# Patient Record
Sex: FEMALE | Race: WHITE | NOT HISPANIC OR LATINO | URBAN - METROPOLITAN AREA
[De-identification: names, ages, dates, MRNs, and addresses within clinical notes are randomized per-mention and may not be internally consistent; named-entity substitution may affect disease eponyms.]

---

## 2021-11-16 ENCOUNTER — TELEPHONE (OUTPATIENT)
Dept: SCHEDULING | Facility: IMAGING CENTER | Age: 41
End: 2021-11-16

## 2021-11-23 ENCOUNTER — TELEPHONE (OUTPATIENT)
Dept: MEDICAL GROUP | Facility: CLINIC | Age: 41
End: 2021-11-23

## 2021-11-23 NOTE — TELEPHONE ENCOUNTER
Pt would like to schedule IUD placement. I spoke with her and she had her consultation in 2019 or 2020. Can she schedule the IUD placement directly, or will she need to schedule a new consultation.  She also needs a new IUD ordered

## 2021-12-17 ENCOUNTER — OFFICE VISIT (OUTPATIENT)
Dept: MEDICAL GROUP | Facility: CLINIC | Age: 41
End: 2021-12-17
Payer: COMMERCIAL

## 2021-12-17 VITALS
DIASTOLIC BLOOD PRESSURE: 64 MMHG | HEIGHT: 71 IN | SYSTOLIC BLOOD PRESSURE: 102 MMHG | WEIGHT: 130 LBS | OXYGEN SATURATION: 98 % | HEART RATE: 91 BPM | BODY MASS INDEX: 18.2 KG/M2

## 2021-12-17 DIAGNOSIS — Z30.017 NEXPLANON INSERTION: ICD-10-CM

## 2021-12-17 PROCEDURE — 11981 INSERTION DRUG DLVR IMPLANT: CPT | Performed by: FAMILY MEDICINE

## 2021-12-17 NOTE — PROCEDURES
Initial visit for Mirena placement which was unsuccessful as the internal os was closed and sounding could not be completed.  The patient opted for the nexplanon.  Reviewed procedure with patient.  All questions were answered.  Consent obtained.  Post procedure instructions reviewed with patient.  Confirmed the expiration date of the implant on the 's box. NDC number of the implant is 7638-8202-42. The implant card was given to the patient.     PROCEDURE: The LEFT arm was measured and marked- 10 cm proximal to the medial epicondyle, 3 cm inferior to the sulcus.  The area was prepped with betadine and infiltrated with  2 cc of 1% lidocaine. The nexplanon device was inserted easily and the implant deployed.  Placement of the implant was confirmed. A pressure dressing was applied.  Patient tolerated procedure well without complications.

## 2022-01-17 NOTE — RESULT ENCOUNTER NOTE
Lonnie Borden,    Your recent mammogram was normal but it did reveal you have dense breasts.   Dense breasts may have small masses that can be obscured on typical mammogram studies.  SonoCine is a new technique that is being used to screen for breast cancer in women with dense breasts. This study is not yet covered by insurance and is $125 out of pocket. If you are interested in having this study done, please contact me for an order.  Otherwise, it is recommended you follow up in one year for your screening mammogram.       Thank you,  Cydney Chapin M.D.

## 2023-10-17 ENCOUNTER — HOSPITAL ENCOUNTER (OUTPATIENT)
Dept: RADIOLOGY | Facility: MEDICAL CENTER | Age: 43
End: 2023-10-17
Attending: OBSTETRICS & GYNECOLOGY
Payer: COMMERCIAL

## 2023-10-17 ENCOUNTER — HOSPITAL ENCOUNTER (OUTPATIENT)
Dept: RADIOLOGY | Facility: MEDICAL CENTER | Age: 43
End: 2023-10-17
Attending: FAMILY MEDICINE
Payer: COMMERCIAL

## 2024-08-27 PROBLEM — Z13.220 LIPID SCREENING: Status: ACTIVE | Noted: 2024-08-27

## 2024-08-27 PROBLEM — R92.8 ABNORMAL MAMMOGRAM: Status: ACTIVE | Noted: 2021-08-31

## 2024-08-27 PROBLEM — Z11.59 ENCOUNTER FOR HEPATITIS C SCREENING TEST FOR LOW RISK PATIENT: Status: ACTIVE | Noted: 2024-08-27

## 2024-08-27 PROBLEM — Z11.4 SCREENING FOR HIV (HUMAN IMMUNODEFICIENCY VIRUS): Status: ACTIVE | Noted: 2024-08-27

## 2024-08-28 ENCOUNTER — OFFICE VISIT (OUTPATIENT)
Dept: MEDICAL GROUP | Facility: CLINIC | Age: 44
End: 2024-08-28
Payer: COMMERCIAL

## 2024-08-28 ENCOUNTER — HOSPITAL ENCOUNTER (OUTPATIENT)
Dept: LAB | Facility: MEDICAL CENTER | Age: 44
End: 2024-08-28
Attending: FAMILY MEDICINE
Payer: COMMERCIAL

## 2024-08-28 VITALS
DIASTOLIC BLOOD PRESSURE: 81 MMHG | HEART RATE: 95 BPM | OXYGEN SATURATION: 97 % | BODY MASS INDEX: 20.58 KG/M2 | SYSTOLIC BLOOD PRESSURE: 119 MMHG | WEIGHT: 147 LBS | HEIGHT: 71 IN | TEMPERATURE: 98 F | RESPIRATION RATE: 16 BRPM

## 2024-08-28 DIAGNOSIS — M79.671 RIGHT FOOT PAIN: ICD-10-CM

## 2024-08-28 DIAGNOSIS — Z11.59 ENCOUNTER FOR HEPATITIS C SCREENING TEST FOR LOW RISK PATIENT: ICD-10-CM

## 2024-08-28 DIAGNOSIS — Z13.220 LIPID SCREENING: ICD-10-CM

## 2024-08-28 DIAGNOSIS — Z30.09 FAMILY PLANNING: ICD-10-CM

## 2024-08-28 DIAGNOSIS — Z12.11 COLON CANCER SCREENING: ICD-10-CM

## 2024-08-28 DIAGNOSIS — R92.8 ABNORMAL MAMMOGRAM: ICD-10-CM

## 2024-08-28 DIAGNOSIS — Z11.4 SCREENING FOR HIV (HUMAN IMMUNODEFICIENCY VIRUS): ICD-10-CM

## 2024-08-28 LAB
ALBUMIN SERPL BCP-MCNC: 4.8 G/DL (ref 3.2–4.9)
ALBUMIN/GLOB SERPL: 1.8 G/DL
ALP SERPL-CCNC: 98 U/L (ref 30–99)
ALT SERPL-CCNC: 6 U/L (ref 2–50)
ANION GAP SERPL CALC-SCNC: 12 MMOL/L (ref 7–16)
AST SERPL-CCNC: 16 U/L (ref 12–45)
BILIRUB SERPL-MCNC: 0.4 MG/DL (ref 0.1–1.5)
BUN SERPL-MCNC: 11 MG/DL (ref 8–22)
CALCIUM ALBUM COR SERPL-MCNC: 9.2 MG/DL (ref 8.5–10.5)
CALCIUM SERPL-MCNC: 9.8 MG/DL (ref 8.5–10.5)
CHLORIDE SERPL-SCNC: 104 MMOL/L (ref 96–112)
CHOLEST SERPL-MCNC: 176 MG/DL (ref 100–199)
CO2 SERPL-SCNC: 22 MMOL/L (ref 20–33)
CREAT SERPL-MCNC: 0.74 MG/DL (ref 0.5–1.4)
GFR SERPLBLD CREATININE-BSD FMLA CKD-EPI: 102 ML/MIN/1.73 M 2
GLOBULIN SER CALC-MCNC: 2.7 G/DL (ref 1.9–3.5)
GLUCOSE SERPL-MCNC: 99 MG/DL (ref 65–99)
HCV AB SER QL: NORMAL
HDLC SERPL-MCNC: 80 MG/DL
HIV 1+2 AB+HIV1 P24 AG SERPL QL IA: NORMAL
LDLC SERPL CALC-MCNC: 78 MG/DL
POTASSIUM SERPL-SCNC: 4.4 MMOL/L (ref 3.6–5.5)
PROT SERPL-MCNC: 7.5 G/DL (ref 6–8.2)
SODIUM SERPL-SCNC: 138 MMOL/L (ref 135–145)
TRIGL SERPL-MCNC: 89 MG/DL (ref 0–149)

## 2024-08-28 PROCEDURE — 99214 OFFICE O/P EST MOD 30 MIN: CPT | Performed by: FAMILY MEDICINE

## 2024-08-28 PROCEDURE — 86803 HEPATITIS C AB TEST: CPT

## 2024-08-28 PROCEDURE — 87389 HIV-1 AG W/HIV-1&-2 AB AG IA: CPT

## 2024-08-28 PROCEDURE — 3074F SYST BP LT 130 MM HG: CPT | Performed by: FAMILY MEDICINE

## 2024-08-28 PROCEDURE — 80061 LIPID PANEL: CPT

## 2024-08-28 PROCEDURE — 3079F DIAST BP 80-89 MM HG: CPT | Performed by: FAMILY MEDICINE

## 2024-08-28 PROCEDURE — 36415 COLL VENOUS BLD VENIPUNCTURE: CPT

## 2024-08-28 PROCEDURE — 80053 COMPREHEN METABOLIC PANEL: CPT

## 2024-08-28 ASSESSMENT — PATIENT HEALTH QUESTIONNAIRE - PHQ9: CLINICAL INTERPRETATION OF PHQ2 SCORE: 0

## 2024-08-28 NOTE — PROGRESS NOTES
"Subjective:   CC: Follow-up contraception    HPI:   Mammo - dense 1/24, ABUS nl, TC 16%  No colon screening yet (45 in Feb '25)  TdaP 2019  Pap? - due  Nexplanon - desires removal, wants to try IUD (failed insertion previously, not on menses today)  Wellbutrin - d/c'd  Problem   Right Foot Pain   Family Planning   Abnormal Mammogram   Anxiety (Resolved)   Postpartum Depression (Resolved)       No current Epic-ordered outpatient medications on file.     No current Epic-ordered facility-administered medications on file.         ROS:  Gen: no fevers/chills  Pulm: no sob, no cough  CV: no chest pain, no palpitations  GI: no nausea/vomiting, no diarrhea        Objective:     Exam:  /81 (BP Location: Left arm, Patient Position: Sitting, BP Cuff Size: Adult)   Pulse 95   Temp 36.7 °C (98 °F) (Temporal)   Resp 16   Ht 1.803 m (5' 11\")   Wt 66.7 kg (147 lb)   SpO2 97%   BMI 20.50 kg/m²  Body mass index is 20.5 kg/m².    Gen: Alert and oriented, No apparent distress.  Neck: Neck is supple without lymphadenopathy.  Lungs: Normal effort, CTA bilaterally, no wheezes, rhonchi, or rales  CV: Regular rate and rhythm. No murmurs, rubs, or gallops.  Ext: No edema.  Right foot, no tenderness no redness no swelling no bruising no deformities, full pulses      Assessment & Plan:     44 y.o. female with the following -     Problem List Items Addressed This Visit       Abnormal mammogram     Mammo 1/24 dense, f/u ABUS wnl, TC 16%         Family planning     She is going to follow-up to have her Nexplanon removed and an IUD inserted.  I have advised her to present for the IUD placement while she is menstruating so that her internal os will be open.  She will also get a Pap smear at that time.         Right foot pain     Has been having some longstanding intermittent nontraumatic pain of her right lateral foot which does not seem to be related to any kind of activity.  It sometimes comes on at night and is never associated with " any redness warmth swelling.  Will start with a plain film and then have her see a sports colleague         Relevant Orders    DX-FOOT-COMPLETE 3+ RIGHT     Other Visit Diagnoses       Colon cancer screening        Relevant Orders    COLOGUARD (FIT DNA)                  No follow-ups on file.

## 2024-08-29 NOTE — ASSESSMENT & PLAN NOTE
Has been having some longstanding intermittent nontraumatic pain of her right lateral foot which does not seem to be related to any kind of activity.  It sometimes comes on at night and is never associated with any redness warmth swelling.  Will start with a plain film and then have her see a sports colleague

## 2024-08-29 NOTE — ASSESSMENT & PLAN NOTE
She is going to follow-up to have her Nexplanon removed and an IUD inserted.  I have advised her to present for the IUD placement while she is menstruating so that her internal os will be open.  She will also get a Pap smear at that time.

## 2024-12-11 ENCOUNTER — OFFICE VISIT (OUTPATIENT)
Dept: MEDICAL GROUP | Facility: CLINIC | Age: 44
End: 2024-12-11
Payer: COMMERCIAL

## 2024-12-11 VITALS
RESPIRATION RATE: 16 BRPM | SYSTOLIC BLOOD PRESSURE: 137 MMHG | HEIGHT: 70 IN | BODY MASS INDEX: 21.19 KG/M2 | WEIGHT: 148 LBS | OXYGEN SATURATION: 98 % | HEART RATE: 70 BPM | DIASTOLIC BLOOD PRESSURE: 84 MMHG

## 2024-12-11 DIAGNOSIS — Z30.09 FAMILY PLANNING: ICD-10-CM

## 2024-12-11 DIAGNOSIS — Z13.79 GENETIC SCREENING: ICD-10-CM

## 2024-12-11 DIAGNOSIS — Z12.11 COLON CANCER SCREENING: ICD-10-CM

## 2024-12-11 PROCEDURE — 3075F SYST BP GE 130 - 139MM HG: CPT | Performed by: FAMILY MEDICINE

## 2024-12-11 PROCEDURE — 99213 OFFICE O/P EST LOW 20 MIN: CPT | Performed by: FAMILY MEDICINE

## 2024-12-11 PROCEDURE — 3079F DIAST BP 80-89 MM HG: CPT | Performed by: FAMILY MEDICINE

## 2024-12-11 RX ORDER — MISOPROSTOL 200 UG/1
200 TABLET ORAL ONCE
Qty: 1 TABLET | Refills: 0 | Status: SHIPPED | OUTPATIENT
Start: 2024-12-11 | End: 2024-12-11

## 2024-12-12 NOTE — PROGRESS NOTES
"Subjective:   CC:    Follow-up  HPI:   Patient wants to discuss having her Nexplanon removed and a Mirena IUD placed.  She is not currently planning to have anymore children.  Her son is now 12 years old.  She did like the Nexplanon however she is looking forward to not having periods    She does have anxiety, she is in therapy twice a month which is going well she is not taking any medications.  She does get out and exercise regularly uses breathing techniques and enjoys traveling with her family  Problem   Family Planning       Current Outpatient Medications Ordered in Epic   Medication Sig Dispense Refill    miSOPROStol (CYTOTEC) 200 MCG Tab Take 1 Tablet by mouth one time for 1 dose. 1 Tablet 0     No current Epic-ordered facility-administered medications on file.         ROS:  Gen: no fevers/chills  Pulm: no sob, no cough  CV: no chest pain, no palpitations  GI: no nausea/vomiting, no diarrhea        Objective:     Exam:  /84 (BP Location: Left arm, Patient Position: Sitting, BP Cuff Size: Adult)   Pulse 70   Resp 16   Ht 1.778 m (5' 10\")   Wt 67.1 kg (148 lb)   SpO2 98%   BMI 21.24 kg/m²  Body mass index is 21.24 kg/m².    Gen: Alert and oriented, No apparent distress.  Neck: Neck is supple without lymphadenopathy.  Lungs: Normal effort, CTA bilaterally, no wheezes, rhonchi, or rales  CV: Regular rate and rhythm. No murmurs, rubs, or gallops.  Ext: No edema.      Assessment & Plan:     44 y.o. female with the following -     Problem List Items Addressed This Visit       Family planning     We will order her Nexplanon and schedule her an appointment for a Nexplanon removal and IUD placement.  Her IUD attempt 3 years ago was unsuccessful she was not on her period we will try a Cytotec 200 mcg p.o. 4 hours prior to IUD placement and ibuprofen 400 mg          Other Visit Diagnoses       Genetic screening        Relevant Orders    Referral to Genetic Research Studies    Colon cancer screening        " Relevant Orders    Cologuard® colon cancer screening                  No follow-ups on file.

## 2024-12-12 NOTE — ASSESSMENT & PLAN NOTE
We will order her Nexplanon and schedule her an appointment for a Nexplanon removal and IUD placement.  Her IUD attempt 3 years ago was unsuccessful she was not on her period we will try a Cytotec 200 mcg p.o. 4 hours prior to IUD placement and ibuprofen 400 mg

## 2025-02-20 ENCOUNTER — TELEPHONE (OUTPATIENT)
Dept: MEDICAL GROUP | Facility: CLINIC | Age: 45
End: 2025-02-20
Payer: COMMERCIAL

## 2025-02-20 NOTE — TELEPHONE ENCOUNTER
Phone Number Called: [phone] (home) [phone] (work)      Call outcome: Left detailed message for patient. Informed to call back with any additional questions.    Message: I called patient and left a message to call us back to schedule her for an IUD placement. No answer left message to call us back

## 2025-05-30 ENCOUNTER — OFFICE VISIT (OUTPATIENT)
Dept: MEDICAL GROUP | Facility: CLINIC | Age: 45
End: 2025-05-30
Payer: COMMERCIAL

## 2025-05-30 VITALS
HEART RATE: 100 BPM | DIASTOLIC BLOOD PRESSURE: 79 MMHG | TEMPERATURE: 98.6 F | HEIGHT: 71 IN | WEIGHT: 124.6 LBS | OXYGEN SATURATION: 96 % | SYSTOLIC BLOOD PRESSURE: 113 MMHG | BODY MASS INDEX: 17.44 KG/M2

## 2025-05-30 DIAGNOSIS — M62.830 LUMBAR PARASPINAL MUSCLE SPASM: ICD-10-CM

## 2025-05-30 DIAGNOSIS — Z12.31 ENCOUNTER FOR SCREENING MAMMOGRAM FOR MALIGNANT NEOPLASM OF BREAST: ICD-10-CM

## 2025-05-30 DIAGNOSIS — Z12.11 SCREENING FOR COLON CANCER: ICD-10-CM

## 2025-05-30 DIAGNOSIS — Z30.018 ENCOUNTER FOR INITIAL PRESCRIPTION OF OTHER CONTRACEPTIVES: ICD-10-CM

## 2025-05-30 PROBLEM — Z30.09 FAMILY PLANNING: Status: RESOLVED | Noted: 2024-08-27 | Resolved: 2025-05-30

## 2025-05-30 RX ORDER — METHOCARBAMOL 750 MG/1
750 TABLET, FILM COATED ORAL 3 TIMES DAILY
COMMUNITY
End: 2025-05-30

## 2025-05-30 RX ORDER — METHOCARBAMOL 750 MG/1
TABLET, FILM COATED ORAL
Qty: 30 TABLET | Refills: 0 | Status: SHIPPED | OUTPATIENT
Start: 2025-05-30 | End: 2025-06-06

## 2025-05-30 RX ORDER — IBUPROFEN 800 MG/1
800 TABLET, FILM COATED ORAL 3 TIMES DAILY
COMMUNITY

## 2025-05-30 ASSESSMENT — PATIENT HEALTH QUESTIONNAIRE - PHQ9: CLINICAL INTERPRETATION OF PHQ2 SCORE: 0

## 2025-05-30 NOTE — PROGRESS NOTES
"[facility] OFFICE VISIT    Date: 5/30/2025    MRN: [id number]  Patient ID: Suha Gay    SUBJECTIVE:  Suha Gay is a 45 y.o. female here for evaluation of left flank pain lasting for the past 4 days.  Patient reports this started gradually after waking 1 morning.  Not associated with any dysuria or polyuria.  Occurs predominantly when rotating.  Patient had a digital visit 2 days ago and was prescribed 750 mg methocarbamol 3 times daily and advised to take 800 mg of ibuprofen every 6-8 hours.  Patient reports that she only started taking the methocarbamol last night.  Pain was a 10 out of 10 in severity, now an 8 out of 10.  Still having difficulty moving, bending, and concerned that she may fall if she bends the wrong way.  No other symptoms to speak of.  Denies any recent trauma.    Patient also notes that she was prescribed an IUD, however due to scheduling issues related to a new telephone number, was unable to have this administered.  Still interested in getting an IUD.  Presently has a Nexplanon.    Patient notes that she is due for breast cancer screening.  Both parents were tested for BRCA genes and were negative.    Patient also notes that while she was prescribed Cologuard, she never performed the test.  Does have the kit at home and is willing to do this.    PMHx/PSHx:  Past Medical History[1]  Problem List[2]  Past Surgical History[3]    Allergies: Patient has no known allergies.    OBJECTIVE:  Vitals:    05/30/25 0813   BP: 113/79   Pulse: 100   Temp: 37 °C (98.6 °F)   SpO2: 96%     Vitals:    05/30/25 0813   BP: 113/79   Weight: 56.5 kg (124 lb 9.6 oz)   Height: 1.803 m (5' 11\")       Physical Examination:  General: Uncomfortable appearing female in no acute distress, resting on arrival to room  HEENT: Normocephalic, atraumatic, EOMI  Cardiovascular: RRR, no murmurs, gallops, or rubs  Pulmonary: CTAB, symmetrical chest expansion, no rales, rhonchi, or wheezes  Abdominal: " Non-tender to palpation, no guarding, rigidity, or distension  Back: No CVA tenderness  Extremities: Moves all spontaneously  Musculoskeletal: Nontender to palpation cervical through lumbar spinous processes, nontender to palpation right paravertebral muscles, minimally tender to palpation in the left lumbar paravertebral muscles, otherwise nontender to left paravertebral muscles, preserved ROM back extension/flexion/lateral flexion/rotation though hesitancy noted  Neurological: Alert and oriented    ASSESSMENT & PLAN:  Suha Gay is a 45 y.o. female here for evaluation of back pain, with other concerns as addressed below.      1. Lumbar paraspinal muscle spasm  methocarbamol (ROBAXIN) 750 MG Tab    Referral to Physical Therapy      2. Encounter for initial prescription of other contraceptives        3. Encounter for screening mammogram for malignant neoplasm of breast  MA-SCREENING MAMMO BILAT W/TOMOSYNTHESIS W/CAD    Referral to Genetic Research Studies      4. Screening for colon cancer            Orders Placed This Encounter    MA-SCREENING MAMMO BILAT W/TOMOSYNTHESIS W/CAD    Referral to Physical Therapy    Referral to Genetic Research Studies    DISCONTD: methocarbamol (ROBAXIN) 750 MG Tab    ibuprofen (MOTRIN) 800 MG Tab    methocarbamol (ROBAXIN) 750 MG Tab       # Lumbar paraspinal muscle spasm  Based on physical exam, likely etiology is the above.  No sign/symptoms suggestive of nephrolithiasis or UTI at this time.  At this time discussed that methocarbamol dose could be increased further to assist with spasm, and have prescribed methocarbamol 150 mg 3 times daily for 3 days followed by 750 mg 3 times daily for 4 days.  Medication sent to pharmacy of choice.  Recommended continuation of ibuprofen and heat pad followed by gentle stretching to relax the muscle.  In the event that this is not resolved, I have placed a referral to physical therapy.  Referrals process discussed.  Patient verbalized  understanding and agreement with plan of care.    # Encounter for initial prescription of other contraceptives  Patient filled out form in order to prescribe IUD.  Will fax this off to company in order to have device into this office.  Recommended that the patient contact our office within 1 month if she does not hear back on the status of her IUD in order to ensure that the device was received.    # Screening for breast cancer  Ordered mammogram screening for patient.  Imaging referrals process discussed.  Have also referred patient to the [employer] [place] project for screening for specific genes known to confer an increased risk for heritable cancers.  Advised patient I will always contact her with study results within 1 week of having a test performed, and to contact the office if she does not hear back on results during that time.    # Screen for colon cancer  Advised patient to complete previously ordered Cologuard.    Morris Camargo M.D.               [1] History reviewed. No pertinent past medical history.  [2]   Patient Active Problem List  Diagnosis    Abnormal mammogram    Right foot pain   [3] History reviewed. No pertinent surgical history.

## 2025-06-10 NOTE — Clinical Note
REFERRAL APPROVAL NOTICE         Sent on Roselyn 10, 2025                   Suha Gay  [address] [place] [address]                   Dear Ms. Gay,    After a careful review of the medical information and benefit coverage, [facility] has processed your referral. See below for additional details.    If applicable, you must be actively enrolled with your insurance for coverage of the authorized service. If you have any questions regarding your coverage, please contact your insurance directly.    REFERRAL INFORMATION   Referral #:  [id number]  Referred-To Department    Referred-By Provider:  Physical Therapy    Morris Camargo M.D.   [facility] [address]      [address]  [place] [address] [phone] [address]  [address]  [place] [id number] [phone]    Referral Start Date:  05/30/2025  Referral End Date:   05/30/2026             SCHEDULING  If you do not already have an appointment, please call [phone] to make an appointment.     MORE INFORMATION  If you do not already have a [employer] account, sign up at: [email or web address]  You can access your medical information, make appointments, see lab results, billing information, and more.  If you have questions regarding this referral, please contact  the [facility] Referrals department at:             [phone]. Monday - Friday 8:00AM - 5:00PM.     Sincerely,    [facility]

## 2025-06-15 DIAGNOSIS — Z13.79 GENETIC SCREENING: Primary | ICD-10-CM

## 2025-06-17 ENCOUNTER — TELEPHONE (OUTPATIENT)
Dept: MEDICAL GROUP | Facility: CLINIC | Age: 45
End: 2025-06-17
Payer: COMMERCIAL

## 2025-06-17 NOTE — TELEPHONE ENCOUNTER
Phone Number Called: [phone]     Call outcome: Spoke to [facility]    Message: Pharmacy is requesting clarification on IUD for pt, requesting a call back or fax for med. clarification

## 2025-06-17 NOTE — TELEPHONE ENCOUNTER
Caller Name: Coco  Call Back Number: [phone]     How would the patient prefer to be contacted with a response: Phone call OK to leave a detailed message    Pharmacist Coco stated that they have recieved Mirena IUD in and wanted to confirm if pt. still wanted to get it.

## 2025-06-17 NOTE — TELEPHONE ENCOUNTER
Phone Number Called: [phone]     Call outcome: Spoke to patient regarding message below.    Message: Pt. confirmed that she would still like to procced with IUD insertion, as the pharmacy has recieved Mirena in.

## 2025-06-24 ENCOUNTER — TELEPHONE (OUTPATIENT)
Dept: MEDICAL GROUP | Facility: CLINIC | Age: 45
End: 2025-06-24
Payer: COMMERCIAL